# Patient Record
Sex: MALE | Race: WHITE | Employment: UNEMPLOYED | URBAN - METROPOLITAN AREA
[De-identification: names, ages, dates, MRNs, and addresses within clinical notes are randomized per-mention and may not be internally consistent; named-entity substitution may affect disease eponyms.]

---

## 2022-05-23 ENCOUNTER — APPOINTMENT (OUTPATIENT)
Dept: GENERAL RADIOLOGY | Age: 47
End: 2022-05-23
Payer: MEDICARE

## 2022-05-23 ENCOUNTER — HOSPITAL ENCOUNTER (EMERGENCY)
Age: 47
Discharge: HOME | End: 2022-05-23
Attending: EMERGENCY MEDICINE
Payer: MEDICARE

## 2022-05-23 VITALS
SYSTOLIC BLOOD PRESSURE: 189 MMHG | WEIGHT: 230 LBS | HEART RATE: 83 BPM | OXYGEN SATURATION: 96 % | BODY MASS INDEX: 32.93 KG/M2 | DIASTOLIC BLOOD PRESSURE: 116 MMHG | TEMPERATURE: 98.8 F | RESPIRATION RATE: 15 BRPM | HEIGHT: 70 IN

## 2022-05-23 DIAGNOSIS — M25.521 RIGHT ELBOW PAIN: Primary | ICD-10-CM

## 2022-05-23 PROCEDURE — 99283 EMERGENCY DEPT VISIT LOW MDM: CPT

## 2022-05-23 PROCEDURE — 73080 X-RAY EXAM OF ELBOW: CPT

## 2022-05-23 RX ORDER — IBUPROFEN 800 MG/1
800 TABLET ORAL EVERY 8 HOURS PRN
Qty: 20 TABLET | Refills: 0 | Status: SHIPPED | OUTPATIENT
Start: 2022-05-23 | End: 2022-08-14

## 2022-05-23 ASSESSMENT — PAIN - FUNCTIONAL ASSESSMENT: PAIN_FUNCTIONAL_ASSESSMENT: 0-10

## 2022-05-23 ASSESSMENT — ENCOUNTER SYMPTOMS
EYE REDNESS: 0
CONSTIPATION: 0
COLOR CHANGE: 0
ABDOMINAL PAIN: 0
VOMITING: 0
EYE DISCHARGE: 0
FACIAL SWELLING: 0
DIARRHEA: 0
COUGH: 0
SHORTNESS OF BREATH: 0

## 2022-05-23 ASSESSMENT — PAIN SCALES - GENERAL: PAINLEVEL_OUTOF10: 8

## 2022-05-23 ASSESSMENT — PAIN DESCRIPTION - ORIENTATION: ORIENTATION: RIGHT

## 2022-05-23 ASSESSMENT — PAIN DESCRIPTION - LOCATION: LOCATION: ARM

## 2022-05-23 NOTE — ED PROVIDER NOTES
[address] [place] [address] ED  EMERGENCY DEPARTMENT ENCOUNTER      Pt Name: Annmarie Rodarte  MRN: [id number]  [place] 1975  Date of evaluation: 5/23/2022  Provider: Seferino Majano MD    CHIEF COMPLAINT       Chief Complaint   Patient presents with    Arm Pain     pt states that he feels that he ripped his bicep yesterday          HISTORY OF PRESENT ILLNESS  (Location/Symptom, Timing/Onset, Context/Setting, Quality, Duration, Modifying Factors, Severity.)   Annmarie Rodarte is a 51 y.o. male who presents to the emergency department for pain in the right elbow. He points to his popliteal fossa. He injured it last night going over a fence when he landed awkwardly. He thinks he may have torn his bicep. It hurts to flex at the elbow. No other injury was sustained. No pain on the tricep side. He rated the pain as an 8. Nursing Notes were reviewed. ALLERGIES     Patient has no known allergies. CURRENT MEDICATIONS       Previous Medications    AMLODIPINE (NORVASC) 5 MG TABLET    TAKE 1 TABLET BY MOUTH ONCE DAILY    LISINOPRIL-HYDROCHLOROTHIAZIDE (PRINZIDE;ZESTORETIC) 20-12.5 MG PER TABLET    TAKE 1 TABLET BY MOUTH ONCE DAILY    SUBOXONE 8-2 MG FILM        UMECLIDINIUM-VILANTEROL (ANORO ELLIPTA) 62.5-25 MCG/INH AEPB INHALER    Inhale 1 puff into the lungs daily       PAST MEDICAL HISTORY         Diagnosis Date    Asthma     Hypertension        SURGICAL HISTORY     History reviewed. No pertinent surgical history. FAMILY HISTORY     History reviewed. No pertinent family history. No family status information on file. SOCIAL HISTORY      reports that he has quit smoking. He has never used smokeless tobacco. He reports current alcohol use. He reports current drug use. Drug: Marijuana Falguni Wendy). REVIEW OF SYSTEMS    (2-9 systems for level 4, 10 or more for level 5)     Review of Systems   Constitutional: Negative for chills, fatigue and fever.    HENT: Negative for congestion, ear discharge and facial swelling. Eyes: Negative for discharge and redness. Respiratory: Negative for cough and shortness of breath. Cardiovascular: Negative for chest pain. Gastrointestinal: Negative for abdominal pain, constipation, diarrhea and vomiting. Genitourinary: Negative for dysuria and hematuria. Musculoskeletal: Negative for arthralgias. Skin: Negative for color change and rash. Neurological: Negative for syncope, numbness and headaches. Hematological: Negative for adenopathy. Psychiatric/Behavioral: Negative for confusion. The patient is not nervous/anxious. Except as noted above the remainder of the review of systems was reviewed and negative. PHYSICAL EXAM    (up to 7 for level 4, 8 or more for level 5)     Vitals:    05/23/22 1115 05/23/22 1159   BP: (!) 189/116    Pulse: 83    Resp: (!) 85 15   Temp: 98.8 °F (37.1 °C)    SpO2: 96%    Weight: 230 lb (104.3 kg)    Height: 5' 10\" (1.778 m)        Physical Exam  Vitals reviewed. Constitutional:       General: He is not in acute distress. Appearance: He is well-developed. He is not diaphoretic. HENT:      Head: Normocephalic and atraumatic. Eyes:      General: No scleral icterus. Right eye: No discharge. Left eye: No discharge. Cardiovascular:      Rate and Rhythm: Normal rate and regular rhythm. Pulmonary:      Effort: Pulmonary effort is normal. No respiratory distress. Breath sounds: Normal breath sounds. No stridor. No wheezing or rales. Abdominal:      General: There is no distension. Palpations: Abdomen is soft. Tenderness: There is no abdominal tenderness. Musculoskeletal:      Cervical back: Neck supple. Comments: He has tenderness in the right popliteal fossa. The wrist is nontender and the shoulder is nontender. He has discomfort with flexion at the elbow. Lymphadenopathy:      Cervical: No cervical adenopathy. Skin:     General: Skin is warm and dry.       Findings: No erythema or rash. Neurological:      Mental Status: He is alert and oriented to person, place, and time. Psychiatric:         Behavior: Behavior normal.             DIAGNOSTIC RESULTS     EKG: All EKG's are interpreted by the Emergency Department Physician who either signs or Co-signs this chart in the absence of a cardiologist.    Not indicated    RADIOLOGY:   Non-plain film images such as CT, Ultrasound and MRI are read by the radiologist. Plain radiographic images are visualized and preliminarily interpreted by the emergency physician with the below findings:    Interpretation per the Radiologist below, if available at the time of this note:    XR ELBOW RIGHT (MIN 3 VIEWS)    Result Date: 5/23/2022  EXAMINATION: THREE XRAY VIEWS OF THE RIGHT ELBOW 5/23/2022 12:09 pm COMPARISON: None. HISTORY: ORDERING SYSTEM PROVIDED HISTORY: Pain TECHNOLOGIST PROVIDED HISTORY: Pain Reason for Exam: pain in elbow after trying to climb a fence and felt something tear. anterior pain. 48-year-old male with right elbow pain FINDINGS: Osseous alignment is normal.  Joint spaces are well maintained. No acute fracture or gross dislocation is seen. The radial head and radial neck appear intact. There is no significant elevation of the posterior fat pad or sail sign to suggest a joint effusion. No acute fracture or dislocation. LABS:  Labs Reviewed - No data to display    All other labs were within normal range or not returned as of this dictation.     EMERGENCY DEPARTMENT COURSE and DIFFERENTIAL DIAGNOSIS/MDM:   Vitals:    Vitals:    05/23/22 1115 05/23/22 1159   BP: (!) 189/116    Pulse: 83    Resp: (!) 85 15   Temp: 98.8 °F (37.1 °C)    SpO2: 96%    Weight: 230 lb (104.3 kg)    Height: 5' 10\" (1.778 m)        Orders Placed This Encounter   Medications    ibuprofen (ADVIL;MOTRIN) 800 MG tablet     Sig: Take 1 tablet by mouth every 8 hours as needed for Pain     Dispense:  20 tablet     Refill:  0       Medical Decision Making: X-rays negative. I suspect he may have at least a partial tear of his biceps tendon. Ace wrap and sling applied and these were checked by me and found to be appropriate, he is neurovascular intact. He is referred to orthopedics. Treatment diagnosis and follow-up were discussed with the patient. CONSULTS:  None    PROCEDURES:  None    FINAL IMPRESSION      1. Right elbow pain          DISPOSITION/PLAN   DISPOSITION Decision To Discharge 05/23/2022 12:32:01 PM      PATIENT REFERRED TO:   Michael Romo MD  [address] [place] [address] [address] [phone]      As needed    [facility] ED  [address] [facility]  [phone]    If symptoms worsen    Marci Cheung MD  [place] [address]  [phone]            DISCHARGE MEDICATIONS:     New Prescriptions    IBUPROFEN (ADVIL;MOTRIN) 800 MG TABLET    Take 1 tablet by mouth every 8 hours as needed for Pain       The care is provided during an unprecedented national emergency due to the novel coronavirus, COVID-19.     (Please note that portions of this note were completed with a voice recognition program.  Efforts were made to edit the dictations but occasionally words are mis-transcribed.)    Jeremias Roy MD  Attending Emergency Physician            Jeremias Roy MD  05/23/22 5095

## 2022-05-31 DIAGNOSIS — M25.521 RIGHT ELBOW PAIN: Primary | ICD-10-CM

## 2022-06-02 ENCOUNTER — OFFICE VISIT (OUTPATIENT)
Dept: ORTHOPEDIC SURGERY | Age: 47
End: 2022-06-02
Payer: MEDICARE

## 2022-06-02 VITALS — RESPIRATION RATE: 10 BRPM | WEIGHT: 230 LBS | BODY MASS INDEX: 32.93 KG/M2 | HEIGHT: 70 IN

## 2022-06-02 DIAGNOSIS — S46.211A RUPTURE OF RIGHT DISTAL BICEPS TENDON, INITIAL ENCOUNTER: Primary | ICD-10-CM

## 2022-06-02 PROCEDURE — G8427 DOCREV CUR MEDS BY ELIG CLIN: HCPCS | Performed by: ORTHOPAEDIC SURGERY

## 2022-06-02 PROCEDURE — 99203 OFFICE O/P NEW LOW 30 MIN: CPT | Performed by: ORTHOPAEDIC SURGERY

## 2022-06-02 PROCEDURE — 4004F PT TOBACCO SCREEN RCVD TLK: CPT | Performed by: ORTHOPAEDIC SURGERY

## 2022-06-02 PROCEDURE — G8417 CALC BMI ABV UP PARAM F/U: HCPCS | Performed by: ORTHOPAEDIC SURGERY
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
## 2022-06-06 ENCOUNTER — HOSPITAL ENCOUNTER (OUTPATIENT)
Dept: MRI IMAGING | Facility: CLINIC | Age: 47
Discharge: HOME | End: 2022-06-08
Payer: MEDICARE

## 2022-06-06 ENCOUNTER — TELEPHONE (OUTPATIENT)
Dept: ORTHOPEDIC SURGERY | Age: 47
End: 2022-06-06

## 2022-06-06 DIAGNOSIS — S46.211A RUPTURE OF RIGHT DISTAL BICEPS TENDON, INITIAL ENCOUNTER: ICD-10-CM

## 2022-06-06 PROCEDURE — 73221 MRI JOINT UPR EXTREM W/O DYE: CPT

## 2022-06-06 NOTE — TELEPHONE ENCOUNTER
LVM for pt to return call to schedule to be seen by Dr. Chua Reveal on referral from Dr. Kermit Vigil for his right elbow.

## 2022-06-10 ENCOUNTER — OFFICE VISIT (OUTPATIENT)
Dept: ORTHOPEDIC SURGERY | Age: 47
End: 2022-06-10
Payer: MEDICARE

## 2022-06-10 VITALS — HEIGHT: 70 IN | WEIGHT: 230 LBS | BODY MASS INDEX: 32.93 KG/M2

## 2022-06-10 DIAGNOSIS — S46.211A RUPTURE OF RIGHT DISTAL BICEPS TENDON, INITIAL ENCOUNTER: ICD-10-CM

## 2022-06-10 DIAGNOSIS — Z76.89 ESTABLISHING CARE WITH NEW DOCTOR, ENCOUNTER FOR: Primary | ICD-10-CM

## 2022-06-10 PROCEDURE — 4004F PT TOBACCO SCREEN RCVD TLK: CPT | Performed by: ORTHOPAEDIC SURGERY

## 2022-06-10 PROCEDURE — 99214 OFFICE O/P EST MOD 30 MIN: CPT | Performed by: ORTHOPAEDIC SURGERY

## 2022-06-10 PROCEDURE — G8427 DOCREV CUR MEDS BY ELIG CLIN: HCPCS | Performed by: ORTHOPAEDIC SURGERY

## 2022-06-10 PROCEDURE — G8417 CALC BMI ABV UP PARAM F/U: HCPCS | Performed by: ORTHOPAEDIC SURGERY
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
## 2022-06-14 ENCOUNTER — TELEPHONE (OUTPATIENT)
Dept: ORTHOPEDIC SURGERY | Age: 47
End: 2022-06-14

## 2022-06-14 NOTE — TELEPHONE ENCOUNTER
LESLEYM for pt to return call. It does not appear that he has scheduled an appt with the PCP that we referred him to for clearance prior to upcoming surgery.  Advised the pt to call back to let us know what provider he has seen or will be seeing so that we can send the request.

## 2022-06-16 NOTE — TELEPHONE ENCOUNTER
LVM with patient stating that his upcoming sx with Dr. Uvaldo Greenberg on 6/21 is going to be cancelled since he did not complete his schedule PAT appt and PCP clearance has also not be obtained yet. I will cancel pre-op and post-op appts. Miriam Ashton will cancel sx.

## 2022-08-14 ENCOUNTER — APPOINTMENT (OUTPATIENT)
Dept: GENERAL RADIOLOGY | Age: 47
End: 2022-08-14
Payer: MEDICARE

## 2022-08-14 ENCOUNTER — HOSPITAL ENCOUNTER (EMERGENCY)
Age: 47
Discharge: HOME | End: 2022-08-14
Attending: EMERGENCY MEDICINE
Payer: MEDICARE

## 2022-08-14 VITALS
HEART RATE: 76 BPM | TEMPERATURE: 98.3 F | OXYGEN SATURATION: 97 % | SYSTOLIC BLOOD PRESSURE: 156 MMHG | BODY MASS INDEX: 31.5 KG/M2 | DIASTOLIC BLOOD PRESSURE: 93 MMHG | WEIGHT: 220 LBS | HEIGHT: 70 IN | RESPIRATION RATE: 14 BRPM

## 2022-08-14 DIAGNOSIS — S61.451A DOG BITE OF RIGHT HAND, INITIAL ENCOUNTER: Primary | ICD-10-CM
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
## 2022-08-14 PROCEDURE — 90471 IMMUNIZATION ADMIN: CPT | Performed by: EMERGENCY MEDICINE

## 2022-08-14 PROCEDURE — 6370000000 HC RX 637 (ALT 250 FOR IP): Performed by: EMERGENCY MEDICINE

## 2022-08-14 PROCEDURE — 6360000002 HC RX W HCPCS: Performed by: EMERGENCY MEDICINE

## 2022-08-14 PROCEDURE — 90714 TD VACC NO PRESV 7 YRS+ IM: CPT | Performed by: EMERGENCY MEDICINE

## 2022-08-14 PROCEDURE — 73130 X-RAY EXAM OF HAND: CPT

## 2022-08-14 PROCEDURE — 99284 EMERGENCY DEPT VISIT MOD MDM: CPT

## 2022-08-14 RX ORDER — TETANUS AND DIPHTHERIA TOXOIDS ADSORBED 2; 2 [LF]/.5ML; [LF]/.5ML
0.5 INJECTION INTRAMUSCULAR ONCE
Status: COMPLETED | OUTPATIENT
Start: 2022-08-14 | End: 2022-08-14

## 2022-08-14 RX ORDER — ACETAMINOPHEN 500 MG
1000 TABLET ORAL EVERY 8 HOURS PRN
Qty: 30 TABLET | Refills: 0 | Status: SHIPPED | OUTPATIENT
Start: 2022-08-14

## 2022-08-14 RX ORDER — AMOXICILLIN AND CLAVULANATE POTASSIUM 875; 125 MG/1; MG/1
1 TABLET, FILM COATED ORAL ONCE
Status: COMPLETED | OUTPATIENT
Start: 2022-08-14 | End: 2022-08-14

## 2022-08-14 RX ORDER — IBUPROFEN 800 MG/1
800 TABLET ORAL ONCE
Status: COMPLETED | OUTPATIENT
Start: 2022-08-14 | End: 2022-08-14

## 2022-08-14 RX ORDER — ACETAMINOPHEN 325 MG/1
650 TABLET ORAL ONCE
Status: COMPLETED | OUTPATIENT
Start: 2022-08-14 | End: 2022-08-14

## 2022-08-14 RX ORDER — AMOXICILLIN AND CLAVULANATE POTASSIUM 875; 125 MG/1; MG/1
1 TABLET, FILM COATED ORAL 2 TIMES DAILY
Qty: 20 TABLET | Refills: 0 | Status: SHIPPED | OUTPATIENT
Start: 2022-08-14 | End: 2022-08-24

## 2022-08-14 RX ORDER — IBUPROFEN 800 MG/1
800 TABLET ORAL EVERY 8 HOURS PRN
Qty: 20 TABLET | Refills: 0 | Status: SHIPPED | OUTPATIENT
Start: 2022-08-14

## 2022-08-14 RX ADMIN — IBUPROFEN 800 MG: 800 TABLET, FILM COATED ORAL at 15:30

## 2022-08-14 RX ADMIN — AMOXICILLIN AND CLAVULANATE POTASSIUM 1 TABLET: 875; 125 TABLET, FILM COATED ORAL at 15:30

## 2022-08-14 RX ADMIN — TETANUS AND DIPHTHERIA TOXOIDS ADSORBED 0.5 ML: 2; 2 INJECTION INTRAMUSCULAR at 15:31

## 2022-08-14 RX ADMIN — ACETAMINOPHEN 650 MG: 325 TABLET, FILM COATED ORAL at 15:30

## 2022-08-14 ASSESSMENT — PAIN - FUNCTIONAL ASSESSMENT: PAIN_FUNCTIONAL_ASSESSMENT: NONE - DENIES PAIN

## 2022-08-14 NOTE — DISCHARGE INSTRUCTIONS
[address] ED Clinic List    Healthcare Providers [facility]  [address] [facility]  [phone] Pediatric Primary Care  Adult Primary Care  OB/GYN/Prenatal/Specialty Clinics Monday - Friday  8:00a - 4:30p   [address] [place] [address]  Adult Medicine, Pediatrics, OB/GYN Monday - Friday  8:30a - [address] [facility]  [phone]  Wednesday 8:30a - 11:00a   [address] Adult Internal Medicine   [facility]  [phone]  [facility]  [phone]    Pediatric Clinic  [phone]   Monday, Tuesday, Thursday, Friday  8:00a - 4:30p  Wednesday 1:00p - 4:30p  Monday, Tuesday, Thursday 8:00a - 5:00p;  Friday 8:00a - 12:30p  Wednesday 1p - 4p  Monday, Tuesday, Thursday, Friday  8:30a - 4:15p  Wednesday 12:30p - [address] [facility]  [phone] Pediatric Primary Care  Adult Primary Care  OB/Prenatal Monday - Wednesday, Friday Monday - Friday 8a - 12p  Thursday 8a - 4:45p   [facility]  [address] [facility]  [phone]  [address]   [phone] Pregnancy  Pre & Post Adoption Counseling  Pregnancy Support  Prenatal / nutrition Care  [employer] Incent[employer] Program [place], [place], Fri 10:00a - 4:30p  Thur 10:00a - [address] Location  Monday - Friday [facility]   OB/GYN  [address] [facility],   [address]  [phone]  Adult Internal Medicine  [address] [facility]  [phone], [address] [facility]  [phone]  Neuro / Headache  [address] [facility],   [facility]   [phone] Monday - Friday  8:30a - 5p   [facility]  [address]  [phone] [facility] [place], Fri  9:00a - 4:30p  Wed 1:00p - 4:30p   [facility] [address] [facility] [address] [facility] [address]  [phone] Family Practice Monday - [address] for the [facility]  [address] [employer]  [phone] Patient must be homeless, call for   eligibility guidelines.   Under age 25 NOT accepted Days and hours vary (Doors open at 8:30a - day of week varies)  Call for an appointment   [address] [place] [address] [facility]  [phone])  Call for an appointment   [place]   ([facility])  [phone]   toll free [phone] For financial assistance

## 2022-08-14 NOTE — ED PROVIDER NOTES
file   Physical Activity: Not on file   Stress: Not on file   Social Connections: Not on file   Intimate Partner Violence: Not on file   Housing Stability: Not on file       No family history on file. Allergies:    Patient has no known allergies. Home Medications:  Prior to Admission medications    Medication Sig Start Date End Date Taking? Authorizing Provider   amoxicillin-clavulanate (AUGMENTIN) 875-125 MG per tablet Take 1 tablet by mouth in the morning and 1 tablet before bedtime. Do all this for 10 days. 8/14/22 8/24/22 Yes Ambar Elizalde DO   ibuprofen (ADVIL;MOTRIN) 800 MG tablet Take 1 tablet by mouth every 8 hours as needed for Pain 8/14/22  Yes Ambar Elizalde DO   acetaminophen (TYLENOL) 500 MG tablet Take 2 tablets by mouth every 8 hours as needed for Pain 8/14/22  Yes Ambar Elizalde DO   omeprazole (PRILOSEC) 10 MG delayed release capsule Take 10 mg by mouth daily    Historical Provider, MD   amLODIPine (NORVASC) 5 MG tablet TAKE 1 TABLET BY MOUTH ONCE DAILY 4/24/17   Claudia Weber MD   lisinopril-hydrochlorothiazide (PRINZIDE;ZESTORETIC) 20-12.5 MG per tablet TAKE 1 TABLET BY MOUTH ONCE DAILY 4/24/17   Claudia Weber MD   umeclidinium-vilanterol (ANORO ELLIPTA) 62.5-25 MCG/INH AEPB inhaler Inhale 1 puff into the lungs daily    Historical Provider, MD       REVIEW OF SYSTEMS    (2-9 systems for level 4, 10 or more for level 5)    Review of Systems   Musculoskeletal:  Positive for myalgias. Skin:  Positive for wound. Allergic/Immunologic: Negative for immunocompromised state. Neurological:  Negative for weakness and numbness. Hematological:  Does not bruise/bleed easily.      PHYSICAL EXAM   (up to 7 for level 4, 8 or more for level 5)    VITALS:   Vitals:    08/14/22 1427   BP: (!) 156/93   Pulse: 76   Resp: 14   Temp: 98.3 °F (36.8 °C)   TempSrc: Oral   SpO2: 97%   Weight: 220 lb (99.8 kg)   Height: 5' 10\" (1.778 m)       Physical Exam  Vitals and nursing note reviewed. Constitutional:       General: He is not in acute distress. Appearance: He is well-developed. He is not diaphoretic. HENT:      Head: Normocephalic and atraumatic. Eyes:      Conjunctiva/sclera: Conjunctivae normal.   Cardiovascular:      Rate and Rhythm: Normal rate and regular rhythm. Pulses: Normal pulses. Pulmonary:      Effort: Pulmonary effort is normal. No respiratory distress. Musculoskeletal:         General: Swelling, tenderness and signs of injury present. Normal range of motion. Right hand: Swelling, laceration and tenderness present. No deformity or bony tenderness. Normal range of motion. Normal strength. Normal sensation. Normal capillary refill. Normal pulse. Cervical back: Normal range of motion. Comments: V shaped laceration over the 2nd MCP with no active bleeding. No bone or tendon exposure    Skin:     General: Skin is warm and dry. Findings: Laceration and wound present. Neurological:      General: No focal deficit present. Mental Status: He is alert. Psychiatric:         Behavior: Behavior normal.       DIFFERENTIAL  DIAGNOSIS   PLAN (LABS / IMAGING / EKG):  Orders Placed This Encounter   Procedures    XR HAND RIGHT (MIN 3 VIEWS)    Wound care       MEDICATIONS ORDERED:  Orders Placed This Encounter   Medications    amoxicillin-clavulanate (AUGMENTIN) 875-125 MG per tablet 1 tablet     Order Specific Question:   Antimicrobial Indications     Answer:   Skin and Soft Tissue Infection    ibuprofen (ADVIL;MOTRIN) tablet 800 mg    acetaminophen (TYLENOL) tablet 650 mg    diptheria-tetanus toxoids (DECAVAC) 2-2 LF/0.5ML injection 0.5 mL    amoxicillin-clavulanate (AUGMENTIN) 875-125 MG per tablet     Sig: Take 1 tablet by mouth in the morning and 1 tablet before bedtime. Do all this for 10 days.      Dispense:  20 tablet     Refill:  0    ibuprofen (ADVIL;MOTRIN) 800 MG tablet     Sig: Take 1 tablet by mouth every 8 hours as needed for Pain Dispense:  20 tablet     Refill:  0    acetaminophen (TYLENOL) 500 MG tablet     Sig: Take 2 tablets by mouth every 8 hours as needed for Pain     Dispense:  30 tablet     Refill:  0     DIAGNOSTIC RESULTS / EMERGENCYDEPARTMENT COURSE / MDM   LABS:  Labs Reviewed - No data to display    RADIOLOGY:  XR HAND RIGHT (MIN 3 VIEWS)    Result Date: 8/14/2022  EXAMINATION: THREE XRAY VIEWS OF THE RIGHT HAND 8/14/2022 3:30 pm COMPARISON: None. HISTORY: ORDERING SYSTEM PROVIDED HISTORY: dog bite injury TECHNOLOGIST PROVIDED HISTORY: dog bite injury Reason for Exam: Dog bite to right hand today FINDINGS: No acute fracture, dislocation or radiopaque foreign body is noted. Mild deformity of the 5th metacarpal bone is noted likely the sequelae of old trauma. Navicular lunate space is well-preserved. No ulnar minus variance. Mild degenerative changes are present in some of the D IP joints. No acute osseous abnormality. No radiopaque foreign body. EMERGENCY DEPARTMENT COURSE:  ED Course as of 08/14/22 1553   Sun Aug 14, 2022   1547 XR HAND RIGHT (MIN 3 VIEWS) [AO]      ED Course User Index  [AO] DO CALLY Durand     Amount and/or Complexity of Data Reviewed  Tests in the radiology section of CPT®: ordered and reviewed  Review and summarize past medical records: yes  Independent visualization of images, tracings, or specimens: yes    Patient Progress  Patient progress: stable      PROCEDURES:  Procedures     CONSULTS:  None    CRITICAL CARE:  NONE    FINAL IMPRESSION     1. Dog bite of right hand, initial encounter        DISPOSITION / PLAN   DISPOSITION Decision To Discharge 08/14/2022 03:47:22 PM      Evaluation and treatment course in the ED, and plan of care upon discharge was discussed in length with the patient. Patient had no further questions prior to being discharged and was instructed to return to the ED for new or worsening symptoms.   Any changes to existing medications or new prescriptions were reviewed with patient and they expressed understanding of how to correctly take their medications and the possible side effects. PATIENT REFERRED TO:  [facility]  [address] [facility]  [phone]    As needed, If symptoms worsen    [address] [facility]  [address] [facility]  [phone]    Call for an appointment with plastics/hand for follow up    DISCHARGE MEDICATIONS:  New Prescriptions    ACETAMINOPHEN (TYLENOL) 500 MG TABLET    Take 2 tablets by mouth every 8 hours as needed for Pain    AMOXICILLIN-CLAVULANATE (AUGMENTIN) 875-125 MG PER TABLET    Take 1 tablet by mouth in the morning and 1 tablet before bedtime. Do all this for 10 days. Ambar Meyers DO  Emergency Medicine Physician    (Please note that portions of this note were completed with a voice recognition program.  Efforts were made to edit the dictations but occasionally words are mis-transcribed.)        Adryan Rasmussen DO  08/14/22 0998